# Patient Record
Sex: FEMALE | Race: WHITE | Employment: PART TIME | URBAN - NONMETROPOLITAN AREA
[De-identification: names, ages, dates, MRNs, and addresses within clinical notes are randomized per-mention and may not be internally consistent; named-entity substitution may affect disease eponyms.]

---

## 2020-09-23 ENCOUNTER — OFFICE VISIT (OUTPATIENT)
Dept: RHEUMATOLOGY | Age: 55
End: 2020-09-23
Payer: MEDICARE

## 2020-09-23 VITALS
DIASTOLIC BLOOD PRESSURE: 88 MMHG | HEIGHT: 65 IN | HEART RATE: 55 BPM | WEIGHT: 235 LBS | OXYGEN SATURATION: 98 % | BODY MASS INDEX: 39.15 KG/M2 | SYSTOLIC BLOOD PRESSURE: 130 MMHG

## 2020-09-23 PROCEDURE — G8428 CUR MEDS NOT DOCUMENT: HCPCS | Performed by: INTERNAL MEDICINE

## 2020-09-23 PROCEDURE — G8417 CALC BMI ABV UP PARAM F/U: HCPCS | Performed by: INTERNAL MEDICINE

## 2020-09-23 PROCEDURE — 99244 OFF/OP CNSLTJ NEW/EST MOD 40: CPT | Performed by: INTERNAL MEDICINE

## 2020-09-23 RX ORDER — PREDNISONE 10 MG/1
TABLET ORAL
Qty: 15 TABLET | Refills: 0 | Status: SHIPPED | OUTPATIENT
Start: 2020-09-23 | End: 2020-10-03

## 2020-09-23 RX ORDER — ACETAMINOPHEN 160 MG
TABLET,DISINTEGRATING ORAL
COMMUNITY

## 2020-09-23 RX ORDER — LISINOPRIL 20 MG/1
20 TABLET ORAL DAILY
COMMUNITY

## 2020-09-23 RX ORDER — AMLODIPINE BESYLATE 10 MG/1
10 TABLET ORAL DAILY
COMMUNITY

## 2020-09-23 RX ORDER — BUTALBITAL, ACETAMINOPHEN AND CAFFEINE 50; 325; 40 MG/1; MG/1; MG/1
1 TABLET ORAL EVERY 4 HOURS PRN
COMMUNITY

## 2020-09-23 RX ORDER — FERROUS SULFATE 325(65) MG
325 TABLET ORAL
COMMUNITY

## 2020-09-23 RX ORDER — OMEGA-3 FATTY ACIDS CAP DELAYED RELEASE 1000 MG 1000 MG
3000 CAPSULE DELAYED RELEASE ORAL
COMMUNITY

## 2020-09-23 RX ORDER — DULOXETIN HYDROCHLORIDE 20 MG/1
20 CAPSULE, DELAYED RELEASE ORAL DAILY
COMMUNITY

## 2020-09-23 RX ORDER — ALBUTEROL SULFATE 0.63 MG/3ML
1 SOLUTION RESPIRATORY (INHALATION) EVERY 6 HOURS PRN
COMMUNITY

## 2020-09-23 RX ORDER — BUDESONIDE AND FORMOTEROL FUMARATE DIHYDRATE 160; 4.5 UG/1; UG/1
2 AEROSOL RESPIRATORY (INHALATION) 2 TIMES DAILY
COMMUNITY

## 2020-09-23 RX ORDER — METOPROLOL TARTRATE 100 MG/1
100 TABLET ORAL 2 TIMES DAILY
COMMUNITY

## 2020-09-23 RX ORDER — MONTELUKAST SODIUM 10 MG/1
10 TABLET ORAL NIGHTLY
COMMUNITY

## 2020-09-23 SDOH — HEALTH STABILITY: MENTAL HEALTH: HOW OFTEN DO YOU HAVE A DRINK CONTAINING ALCOHOL?: NEVER

## 2020-09-23 ASSESSMENT — ENCOUNTER SYMPTOMS
NAUSEA: 0
CONSTIPATION: 0
EYE REDNESS: 0
EYE PAIN: 0
DIARRHEA: 0
VOMITING: 0
COUGH: 0
EYES NEGATIVE: 1
WHEEZING: 0
SHORTNESS OF BREATH: 0

## 2020-09-23 NOTE — PROGRESS NOTES
[place]   Date Of Service: 9/23/2020  Provider: Damion Kohler DO  Name: Chon Ferrara   MRN: [id number]    Chief Complaint(s)      Chief Complaint   Patient presents with   Aime Rutherford Established New Doctor     RHEUMATOID ATHRITIS        History of Present illness (HPI)    Chon Ferrara   is a(n)55 y.o. female with a hx of high blood pressure, obesity, osteoarthritis, h/o rheumatoid arthritis,  tobacco dependence, depression,  referred by Consuelo Solorzano CNP for evaluation of polyarthralgia and reported rheumatoid arthritis. Symptoms started around 23 y.o.a with pain and swelling of the knees. Pain has since become generalized & worsened now with pain affecting the hands, wristt, elbows, shoulder, hips, kenes, nakles, toes, neck, and back. Pain is constant in the shoulder, hands, right  Leg . Varies from 5-9/10. Timing: mornings, evenings after working. Aggravating factors: increased use of joint. Weather changes, back - standing, walking, endings. Legs \" wt bering\". Alleviating factors: tylenol, biofreeze, Current therapy: cymbatla, tylenoll. Previous therapy:biofreeze, flexeril, OTC NSAIDs    Active joint swelling bilatearl hands, knees, ankles (dependent) . +  Stiffness about 2-3 hours, + weakness of hands and legs (worse with walking). Difficulty grasping with dropping a lot of things. Difficulty getting up from seated positiong. + gelling. + fatigue, + itching eyes . Swelling of the.  Redness of hands intermittently occurring. + parethesia of hands nad arms - causing insomnia. + cramping - hands , shoulder and legs. Difficulty sleeping b/c of pain.        -denies Photosenstivity, Rash, dry mouth/dry eyes, oral/nasal sores, Raynaud's, digital ulcerations, skin tightening, renal disease,foamy urination, hematuria, sz's, blood clots,  pre-term labor loss, AIHA,leukpenia/lymphopenia, thrombocytopenia, hair loss, serositis,      Review of Systems    Review of Systems   Constitutional: Positive for fatigue. Negative for diaphoresis and fever. HENT: Negative for congestion, hearing loss and nosebleeds. Eyes: Negative. Negative for pain and redness. Respiratory: Negative for cough, shortness of breath and wheezing. Cardiovascular: Negative. Negative for chest pain. Gastrointestinal: Negative for constipation, diarrhea, nausea and vomiting. Genitourinary: Negative for difficulty urinating, frequency and hematuria. Musculoskeletal: Negative for myalgias. Skin: Negative for rash. Neurological: Negative for dizziness, weakness and headaches. Hematological: Does not bruise/bleed easily. Psychiatric/Behavioral: Negative for sleep disturbance. The patient is not nervous/anxious. PAST MEDICAL HISTORY     has a past medical history of Anxiety, Depression, Hypertension, Migraine, and Osteoarthritis. PAST SURGICAL HISTORY     has a past surgical history that includes sinus surgery; knee surgery; Tubal ligation; Gastric bypass surgery; Appendectomy; Neck surgery; and hernia repair. FAMILY HISTORY      Family History   Problem Relation Age of Onset    Rheum Arthritis Mother     Cancer Mother         non-hogkins lymphoma    Crohn's Disease Paternal Cousin     Depression Daughter     Asthma Daughter     Anxiety Disorder Daughter        SOCIAL HISTORY     reports that she has been smoking. She has been smoking about 0.50 packs per day. She has never used smokeless tobacco. She reports that she does not drink alcohol or use drugs.       ALLERGIES     Allergies   Allergen Reactions    Erythromycin Hives and Shortness Of Breath       CURRENT MEDICATIONS      Current Outpatient Medications:     albuterol (ACCUNEB) 0.63 MG/3ML nebulizer solution, Take 1 ampule by nebulization every 6 hours as needed for Wheezing, Disp: , Rfl:     budesonide-formoterol (SYMBICORT) 160-4.5 MCG/ACT AERO, Inhale 2 puffs into the lungs 2 times daily, Disp: , Rfl:     DULoxetine (CYMBALTA) 20 MG extended release capsule, Take 20 mg by mouth daily, Disp: , Rfl:     lisinopril (PRINIVIL;ZESTRIL) 20 MG tablet, Take 20 mg by mouth daily, Disp: , Rfl:     montelukast (SINGULAIR) 10 MG tablet, Take 10 mg by mouth nightly, Disp: , Rfl:     amLODIPine (NORVASC) 10 MG tablet, Take 10 mg by mouth daily, Disp: , Rfl:     Cholecalciferol (VITAMIN D3) 50 MCG (2000 UT) CAPS, Take by mouth, Disp: , Rfl:     metoprolol (LOPRESSOR) 100 MG tablet, Take 100 mg by mouth 2 times daily, Disp: , Rfl:     butalbital-acetaminophen-caffeine (FIORICET, ESGIC) -40 MG per tablet, Take 1 tablet by mouth every 4 hours as needed for Headaches, Disp: , Rfl:     Omega-3 Fatty Acids (FISH OIL) 1000 MG CPDR, Take 3,000 mg by mouth, Disp: , Rfl:     ferrous sulfate (IRON 325) 325 (65 Fe) MG tablet, Take 325 mg by mouth daily (with breakfast), Disp: , Rfl:     PHYSICAL EXAMINATION / OBJECTIVE     Objective:  /88 (Site: Left Upper Arm, Position: Sitting, Cuff Size: Medium Adult)   Pulse 55   Ht 5' 5\" (1.651 m)   Wt 235 lb (106.6 kg)   SpO2 98%   BMI 39.11 kg/m²     General Appearance:   alert and oriented to person, place and time well-developed and well nourished  Physch : appropriate affects ,   Head:  Normocephalic and atraumatic  Eyes: No gross abnormalities. ,  PERRL, Sclera nonicteric, conjunctiva non-INJECTED  ENT:  MMM,  no deformities , NO oral/nasal sores, Non-tender sinuses. Neck:  Neck supple, Non-tender, No  mass, thyromegaly,    Lymph:  No cervical  or  supraclavicular lymph node swelling.     Pulmonary/Chest:  CTA bilat. , normal air movement, no respiratory distress  Cardiovascular:  Normal rate, + S1 and S2,  NO murmurs , rubs, gallups,       :  Deferred   Abd/GI: Deferred   Neurologic:  gait and coordination normal and speech normal  Skin:  Skin color and temp ,  No rashes or lesions  Fatty subcu nodules along the left dorsal forearm and mid upper left arm    Extremities:  No clubbing , Musculoskeletal:  Limited  ROM shoulder bilat, Rt wrist (flx/ext). , 4+-5/5 Strength bilat upper and lower ext. Upper extremities: SHOULDERS tender : right > left. Charley Sorrow + speed, hawking carlos (right). ,      ELBOWS tender bilat. NEG. tinel,    WRISTS  + tender right > left, + tinel bilat. - phanel. HANDS/FINGERS - Tender MCP 1-3 bilat, PIPs 2-5 bilat. DIPs 1-5 bilat. . Swelling MCPs 2-3 bilat, PIPs 2-4 left,  DIPs left 1-5.  + heberden nodes  Lower extremities: HIPS  tedner bilat.  , KNEES tender, crepitus bilat. Neg. Draw. Compression, lashawn. , ANKLES non-tender , FEET : tender - post righ heal      Spine:  C-spine, T-spine & L-spine:   tender Lumbar spine, traps,  ,  + shober, - tea, - Occiput to wall , - SLR/Cross SLR.       KEY:  Tender : T  Swelling: S  Non-tender : NT  No swelling: NS         LABS        CBC  No results found for: WBC, RBC, HGB, HCT, MCV, MCH, MCHC, RDW, PLT    CMP  No results found for: CALCIUM, LABALBU, PROT, NA, K, CO2, CL, BUN, CREATININE, ALKPHOS, ALT, AST    HgBA1c: No components found for: HGBA1C    No results found for: TSHFT4, TSH  No results found for: VITD25      No results found for: ANASCRN  No results found for: SSA  No results found for: SSB  No results found for: ANTI-SMITH  No results found for: DSDNAAB   No results found for: ANTIRNP  No results found for: C3, C4  No results found for: CCPAB  No results found for: RF    No components found for: CANCASCRN, APANCASCRN  No results found for: SEDRATE  No results found for: CRP    RADIOLOGY:       ASSESSMENT/PLAN    Assessment   Plan     No diagnosis found. 1. Polyarthralgia  / inflammatory arthritis. 2 ANURAG positive 1:640 nucloar dot. -Symptoms started around 23years of age with bilateral knee pain reported swelling. Progressively spread to generalized pain. Reported joint swelling affecting bilateral hands, knees, ankles (dependent swelling of ankles).   Positive prolonged morning stiffness greater than 60 minutes. Reported fatigue, itchy eyes, paresthesias bilateral hands worse with use. Relief of arthralgias with NSAIDs.,  Tylenol, Biofreeze. Prior treatment including back injections. Prior knee arthroscopy and cervical fusion. Mother with reported rheumatoid arthritis. Physical examination with synovitis bilateral MCPs, left PIPs, left DIPs, limited lumbosacral range of motion and multiple tender joints and myofascial tender points. Evaluation for systemic lupus spondyloarthropathy and rheumatoid arthritis. Distribution atypical for rheumatoid arthritis given DIP joint involvement and concerning for underlying spondyloarthropathy because of the limited lumbosacral range of motion and DIP joint involvement. Additionally concern for connective tissue disease given the patient's, + ANURAG and inflammatory arthritis. - prednisone challenge provided. - serologic and x-ray evaluation as outlined below. - CBC Auto Differential; Future  - Comprehensive Metabolic Panel; Future  - Sedimentation Rate; Future  - C-Reactive Protein; Future  - Miscellaneous Sendout 1; Future  - Hepatitis Panel, Acute; Future  - Uric Acid; Future  - C3 Complement; Future  - C4 Complement; Future  - Lupus Anticoagulant; Future  - Urinalysis; Future  - XR CHEST (2 VW); Future        2. Chronic midline low back pain with bilateral sciatica  Bilateral radicular symptoms reported. Prolonged morning stiffness, nighttime waking because of pain. Prolonged morning stiffness. Positive Schober testing on physical examination. Negative Jose's and occiput to wall testing. -- X-ray evaluation to evaluate for degenerative arthritis versus inflammatory features. - CBC Auto Differential; Future  - Comprehensive Metabolic Panel; Future  - Sedimentation Rate; Future  - C-Reactive Protein; Future  - Miscellaneous Sendout 1; Future  - Hepatitis Panel, Acute; Future  - Uric Acid; Future  - C3 Complement;  Future  - C4 Complement;

## 2020-10-15 LAB
ALBUMIN SERPL-MCNC: 4.1 G/DL
ALP BLD-CCNC: 101 U/L
ALT SERPL-CCNC: 17 U/L
ANION GAP SERPL CALCULATED.3IONS-SCNC: 1.2 MMOL/L
AST SERPL-CCNC: 13 U/L
BASOPHILS ABSOLUTE: NORMAL
BASOPHILS RELATIVE PERCENT: NORMAL
BILIRUB SERPL-MCNC: 0.6 MG/DL (ref 0.1–1.4)
BILIRUBIN, URINE: NEGATIVE
BLOOD, URINE: NEGATIVE
BUN BLDV-MCNC: 18 MG/DL
C-REACTIVE PROTEIN: 0.14
CALCIUM SERPL-MCNC: 9 MG/DL
CHLORIDE BLD-SCNC: 101 MMOL/L
CLARITY: NORMAL
CO2: 25 MMOL/L
COLOR: NORMAL
CREAT SERPL-MCNC: 0.71 MG/DL
EOSINOPHILS ABSOLUTE: NORMAL
EOSINOPHILS RELATIVE PERCENT: NORMAL
GFR CALCULATED: 60
GLUCOSE BLD-MCNC: 93 MG/DL
GLUCOSE URINE: NEGATIVE
HCT VFR BLD CALC: 37.6 % (ref 36–46)
HEMOGLOBIN: 12.5 G/DL (ref 12–16)
KETONES, URINE: NEGATIVE
LEUKOCYTE ESTERASE, URINE: NEGATIVE
LYMPHOCYTES ABSOLUTE: NORMAL
LYMPHOCYTES RELATIVE PERCENT: NORMAL
MCH RBC QN AUTO: 31.1 PG
MCHC RBC AUTO-ENTMCNC: 33.2 G/DL
MCV RBC AUTO: 93.7 FL
MONOCYTES ABSOLUTE: NORMAL
MONOCYTES RELATIVE PERCENT: NORMAL
NEUTROPHILS ABSOLUTE: NORMAL
NEUTROPHILS RELATIVE PERCENT: NORMAL
NITRITE, URINE: NEGATIVE
PDW BLD-RTO: 13.9 %
PH UA: 6 (ref 4.5–8)
PLATELET # BLD: 374 K/ΜL
PMV BLD AUTO: 7.2 FL
POTASSIUM SERPL-SCNC: 4.5 MMOL/L
PROTEIN UA: NEGATIVE
RBC # BLD: 4.02 10^6/ΜL
SEDIMENTATION RATE, ERYTHROCYTE: 21
SODIUM BLD-SCNC: 134 MMOL/L
SPECIFIC GRAVITY, URINE: 1.01
TOTAL CK: 58 U/L
TOTAL PROTEIN: 7.4
URIC ACID: 4.7
UROBILINOGEN, URINE: NORMAL
WBC # BLD: 7 10^3/ML

## 2020-10-19 NOTE — RESULT ENCOUNTER NOTE
The chest x-ray revealed possible haziness/atelectasis within the left lung. Please keep your scheduled follow-up appointment for next week.

## 2020-10-19 NOTE — RESULT ENCOUNTER NOTE
Blood testing revealing a mild elevation of the platelet count which can be related to active systemic inflammation. The liver, blood counts and urine studies revealed no significant abnormalities. The uric acid which helps evaluate for possible gouty arthritis was also within normal limits. Additional blood tests are still pending once they have returned you will be notified.

## 2020-10-19 NOTE — RESULT ENCOUNTER NOTE
Worsening of the arthritic changes of the lumbar spine along with a slipped disc affecting the lower lumbar spine.

## 2020-10-20 ENCOUNTER — TELEPHONE (OUTPATIENT)
Dept: RHEUMATOLOGY | Age: 55
End: 2020-10-20

## 2020-10-20 NOTE — TELEPHONE ENCOUNTER
----- Message from Quincy Lyle DO sent at 10/19/2020  5:19 PM EDT -----  The chest x-ray revealed possible haziness/atelectasis within the left lung. Please keep your scheduled follow-up appointment for next week.

## 2020-10-20 NOTE — TELEPHONE ENCOUNTER
----- Message from Darian Dawn DO sent at 10/19/2020  5:17 PM EDT -----  Worsening of the arthritic changes of the lumbar spine along with a slipped disc affecting the lower lumbar spine.

## 2020-10-20 NOTE — TELEPHONE ENCOUNTER
----- Message from Kev Milner DO sent at 10/19/2020  5:15 PM EDT -----  The x-ray of the hands revealed degenerative arthritis affecting both hands.

## 2020-10-20 NOTE — TELEPHONE ENCOUNTER
----- Message from Tin Townsend DO sent at 10/19/2020  5:16 PM EDT -----  Blood testing revealing a mild elevation of the platelet count which can be related to active systemic inflammation. The liver, blood counts and urine studies revealed no significant abnormalities. The uric acid which helps evaluate for possible gouty arthritis was also within normal limits. Additional blood tests are still pending once they have returned you will be notified.

## 2020-10-20 NOTE — TELEPHONE ENCOUNTER
Attempted to call her \"calling restrictions\"     Called her EC and left message to have her call back

## 2020-10-26 ENCOUNTER — OFFICE VISIT (OUTPATIENT)
Dept: RHEUMATOLOGY | Age: 55
End: 2020-10-26
Payer: MEDICARE

## 2020-10-26 VITALS
SYSTOLIC BLOOD PRESSURE: 104 MMHG | WEIGHT: 240.6 LBS | HEART RATE: 56 BPM | BODY MASS INDEX: 40.08 KG/M2 | HEIGHT: 65 IN | OXYGEN SATURATION: 96 % | TEMPERATURE: 97.1 F | DIASTOLIC BLOOD PRESSURE: 58 MMHG

## 2020-10-26 PROCEDURE — 4004F PT TOBACCO SCREEN RCVD TLK: CPT | Performed by: NURSE PRACTITIONER

## 2020-10-26 PROCEDURE — G8484 FLU IMMUNIZE NO ADMIN: HCPCS | Performed by: NURSE PRACTITIONER

## 2020-10-26 PROCEDURE — 99213 OFFICE O/P EST LOW 20 MIN: CPT | Performed by: NURSE PRACTITIONER

## 2020-10-26 PROCEDURE — 3017F COLORECTAL CA SCREEN DOC REV: CPT | Performed by: NURSE PRACTITIONER

## 2020-10-26 PROCEDURE — G8427 DOCREV CUR MEDS BY ELIG CLIN: HCPCS | Performed by: NURSE PRACTITIONER

## 2020-10-26 PROCEDURE — G8417 CALC BMI ABV UP PARAM F/U: HCPCS | Performed by: NURSE PRACTITIONER

## 2020-10-26 ASSESSMENT — ENCOUNTER SYMPTOMS
ABDOMINAL PAIN: 0
SHORTNESS OF BREATH: 0
EYE PAIN: 0
NAUSEA: 0
EYE ITCHING: 0
BACK PAIN: 0
COUGH: 1
TROUBLE SWALLOWING: 0
DIARRHEA: 0
CONSTIPATION: 0

## 2020-10-26 NOTE — PROGRESS NOTES
[facility] RHEUMATOLOGY FOLLOW UP NOTE       Date Of Service: 10/26/2020  Provider: LIA Murray - CNP    Name: Stephenie Miller   MRN: [id number]    Chief Complaint(s)     Chief Complaint   Patient presents with    Follow-up     4 week f/u  bilat sciatica        History of Present Illness (HPI)     Stephenie Miller  is a(n)55 y.o. female with a hx of HTN, obesity, osteoarthritis, h/o rheumatoid arthritis, tobacco dependence, depression here for the f/u evaluation of rheumatoid arthritis. Interval hx:    - good relief with prednisone   - have not received AVISE panel results    pain affecting the bilateral fingers, wrists, elbows, shoulders, hips, knees, ankles, right toes, neck, back  Pain on a scale 0-10: 7/10  Type of pain: constant shoulder, hands  Timing: mornings and evenings  Aggravating factors: increase use of joint, weather changes. Back: standing, walking. Legs: wt bearing. Alleviating factors: tylenol, biofreeze    Associated symptoms:  + swelling/  Redness/ warmth (hands), + AM stiffness lasting ~ 3 hours,  +  gelling        REVIEW OF SYSTEMS: (ROS)    Review of Systems   Constitutional: Negative for fatigue, fever and unexpected weight change. HENT: Negative for congestion and trouble swallowing. Eyes: Negative for pain and itching. Respiratory: Positive for cough (dry cough). Negative for shortness of breath. Cardiovascular: Negative for chest pain and leg swelling. Gastrointestinal: Negative for abdominal pain, constipation, diarrhea and nausea. Endocrine: Negative for cold intolerance and heat intolerance. Genitourinary: Negative for difficulty urinating, frequency and urgency. Musculoskeletal: Positive for arthralgias, joint swelling and myalgias. Negative for back pain. Skin: Negative for rash. Neurological: Positive for weakness and headaches. Negative for dizziness and numbness. Psychiatric/Behavioral: Positive for sleep disturbance.  The patient is nervous/anxious.         PAST MEDICAL HISTORY      Past Medical History:   Diagnosis Date    Anxiety     Depression     Hypertension     Migraine     Osteoarthritis        PAST SURGICAL HISTORY      Past Surgical History:   Procedure Laterality Date    APPENDECTOMY      GASTRIC BYPASS SURGERY      HERNIA REPAIR      KNEE SURGERY      NECK SURGERY      SINUS SURGERY      TUBAL LIGATION         FAMILY HISTORY      Family History   Problem Relation Age of Onset    Rheum Arthritis Mother     Cancer Mother         non-hogkins lymphoma    Crohn's Disease Paternal Cousin     Depression Daughter     Asthma Daughter     Anxiety Disorder Daughter        SOCIAL HISTORY      Social History     Tobacco History     Smoking Status  Current Every Day Smoker Smoking Frequency  0.5 packs/day    Smokeless Tobacco Use  Never Used          Alcohol History     Alcohol Use Status  Never          Drug Use     Drug Use Status  Never          Sexual Activity     Sexually Active  Not Asked                ALLERGIES     Allergies   Allergen Reactions    Erythromycin Hives and Shortness Of Breath       CURRENT MEDICATIONS      Current Outpatient Medications   Medication Sig Dispense Refill    albuterol (ACCUNEB) 0.63 MG/3ML nebulizer solution Take 1 ampule by nebulization every 6 hours as needed for Wheezing      budesonide-formoterol (SYMBICORT) 160-4.5 MCG/ACT AERO Inhale 2 puffs into the lungs 2 times daily      DULoxetine (CYMBALTA) 20 MG extended release capsule Take 20 mg by mouth daily      lisinopril (PRINIVIL;ZESTRIL) 20 MG tablet Take 20 mg by mouth daily      montelukast (SINGULAIR) 10 MG tablet Take 10 mg by mouth nightly      amLODIPine (NORVASC) 10 MG tablet Take 10 mg by mouth daily      Cholecalciferol (VITAMIN D3) 50 MCG (2000 UT) CAPS Take by mouth      metoprolol (LOPRESSOR) 100 MG tablet Take 100 mg by mouth 2 times daily      butalbital-acetaminophen-caffeine (FIORICET, ESGIC) -40 MG per tablet Take 1 tablet by mouth every 4 hours as needed for Headaches      Omega-3 Fatty Acids (FISH OIL) 1000 MG CPDR Take 3,000 mg by mouth      ferrous sulfate (IRON 325) 325 (65 Fe) MG tablet Take 325 mg by mouth daily (with breakfast)       No current facility-administered medications for this visit. PHYSICAL EXAMINATION / OBJECTIVE   Objective:  BP (!) 104/58 (Site: Left Upper Arm, Position: Sitting, Cuff Size: Large Adult)   Pulse 56   Temp 97.1 °F (36.2 °C)   Ht 5' 5\" (1.651 m)   Wt 240 lb 9.6 oz (109.1 kg)   SpO2 96%   BMI 40.04 kg/m²     Physical Exam  Vitals signs reviewed. Constitutional:       Appearance: She is well-developed. Neck:      Musculoskeletal: Normal range of motion and neck supple. Cardiovascular:      Rate and Rhythm: Normal rate and regular rhythm. Pulmonary:      Effort: Pulmonary effort is normal.      Breath sounds: Normal breath sounds. Abdominal:      Palpations: Abdomen is soft. Tenderness: There is no abdominal tenderness. Skin:     General: Skin is warm and dry. Findings: No rash. Neurological:      Mental Status: She is alert and oriented to person, place, and time. Deep Tendon Reflexes: Reflexes are normal and symmetric. Psychiatric:         Thought Content: Thought content normal.       Musculoskeletal:     Normal gait. Strength 5/5 in biceps, triceps, hips, knees,      Upper extremities:   Shoulders:  Tender bilat, No Deformities and intact ROM  Elbows:      + tender bilat, no swelling, No Deformities and intact ROM  Wrists        + tender R>L, + tinel bilat  Hands:      + tender bilat 1-3 MCPs, bilat 2-5 PIPs.  + bogginess bilat 2,3 MCP, left 2,3 PIPs, + heberden nodes    Lower extremities:  Hips:       Tender bilat outer hips, No Deformities and intact ROM  Knees :   + tender bilat, no swelling, No Deformities and intact ROM  Ankles:   Nontender, no swelling, No Deformities and intact ROM  Feet:       + tender right ankle, no swelling,         RAPID 3:   10/26/2020 --- RAPID 3: 4 + 7 + 7.5 = 18.5     Remission: <3  Low Disease Activity: <6  Moderate Disease Activity: >=6 and <=12  High Disease Activity: >12     LABS    CBC  Lab Results   Component Value Date    WBC 7.0 10/15/2020    RBC 4.02 10/15/2020    HGB 12.5 10/15/2020    HCT 37.6 10/15/2020    MCV 93.7 10/15/2020    MCH 31.1 10/15/2020    MCHC 33.2 10/15/2020    RDW 13.9 10/15/2020     10/15/2020       CMP  Lab Results   Component Value Date    CALCIUM 9.0 10/15/2020    LABALBU 4.1 10/15/2020     10/15/2020    K 4.5 10/15/2020    CO2 25 10/15/2020     10/15/2020    BUN 18 10/15/2020    CREATININE 0.71 10/15/2020    ALKPHOS 101 10/15/2020    ALT 17 10/15/2020    AST 13 10/15/2020       HgBA1c: No components found for: HGBA1C    No results found for: VITD25      No results found for: ANASCRN  No results found for: SSA  No results found for: SSB  No results found for: ANTI-SMITH  No results found for: DSDNAAB   No results found for: ANTIRNP  No results found for: C3, C4  No results found for: CCPAB  No results found for: RF    No components found for: CANCASCRN, APANCASCRN  Lab Results   Component Value Date    SEDRATE 21 10/15/2020     Lab Results   Component Value Date    CRP 0.14 10/15/2020       RADIOLOGY:     Xray lumbar spine 10/15/2020      ASSESSMENT/PLAN    Assessment   Plan     Inflammatory arthritis  + ANURAG 1:640 nucleolar dot  - symptoms started around 19 yoa w/ bilat knee pain and reported swelling. Progressively spread to generalized pain. Reported joint swelling affecting bilateral hand, knees, ankles (dependent swelling of ankles). + prolonged morning stifness. Reported fatigue, itchy eyes, paresthesias bilateral hands worse with use. Relief w/ NSAIDs, tylenol, biofreeze. Exam with synovitis MCPs, PIPs, DIPs. Limited lumbosacral ROM.    - prior tx: prednisone (sig relief)   - still awaiting AVISE panel- calling for results to be faxed   - normal

## 2020-10-28 NOTE — RESULT ENCOUNTER NOTE
The lab testing used to help evaluate for systemic lupus rheumatoid arthritis and other inflammatory conditions were negative. Given the continued inflammatory arthritis with like to pursue sulfasalazine twice daily or methotrexate once weekly. Both these medications can potentially be associated with side effects including but not limited to liver injury, low blood counts, increased risk for infections, upset stomach, nausea and vomiting, fatigue, lung toxicity/injury. Blood testing would be required once monthly initially but could be transitioned to every 12 weeks when stable dosing is obtained.

## 2020-10-29 ENCOUNTER — TELEPHONE (OUTPATIENT)
Dept: RHEUMATOLOGY | Age: 55
End: 2020-10-29

## 2020-10-29 RX ORDER — FOLIC ACID 1 MG/1
1 TABLET ORAL DAILY
Qty: 90 TABLET | Refills: 1 | Status: SHIPPED | OUTPATIENT
Start: 2020-10-29 | End: 2021-04-26

## 2020-10-29 NOTE — TELEPHONE ENCOUNTER
Patient called and notified of results. She voiced understanding. Medications discussed in length and she is okay with starting either medication- whichever Dr. Blas Horton thinks would be best.     Rx to [facility].

## 2021-04-26 RX ORDER — FOLIC ACID 1 MG/1
1 TABLET ORAL DAILY
Qty: 30 TABLET | Refills: 1 | Status: SHIPPED | OUTPATIENT
Start: 2021-04-26

## 2021-06-29 RX ORDER — FOLIC ACID 1 MG/1
1 TABLET ORAL DAILY
Qty: 30 TABLET | Refills: 11 | OUTPATIENT
Start: 2021-06-29

## 2022-12-13 ENCOUNTER — TELEPHONE (OUTPATIENT)
Dept: OBGYN CLINIC | Age: 57
End: 2022-12-13

## 2022-12-13 ENCOUNTER — OFFICE VISIT (OUTPATIENT)
Dept: OBGYN CLINIC | Age: 57
End: 2022-12-13

## 2022-12-13 VITALS — BODY MASS INDEX: 36.01 KG/M2 | WEIGHT: 216.4 LBS | SYSTOLIC BLOOD PRESSURE: 130 MMHG | DIASTOLIC BLOOD PRESSURE: 88 MMHG

## 2022-12-13 DIAGNOSIS — R19.09 LUMP IN THE GROIN: Primary | ICD-10-CM

## 2022-12-13 PROBLEM — I10 ESSENTIAL (PRIMARY) HYPERTENSION: Status: ACTIVE | Noted: 2018-03-02

## 2022-12-13 RX ORDER — MELOXICAM 15 MG/1
TABLET ORAL
COMMUNITY
Start: 2022-12-12

## 2022-12-13 RX ORDER — BUSPIRONE HYDROCHLORIDE 5 MG/1
TABLET ORAL
COMMUNITY
Start: 2022-12-01

## 2022-12-13 RX ORDER — LEVOTHYROXINE SODIUM 0.07 MG/1
TABLET ORAL
COMMUNITY
Start: 2022-12-12

## 2022-12-13 RX ORDER — CYCLOBENZAPRINE HCL 10 MG
TABLET ORAL
COMMUNITY
Start: 2022-12-12

## 2022-12-13 NOTE — TELEPHONE ENCOUNTER
Records requested from [facility].  12-13-22 cg

## 2022-12-13 NOTE — PROGRESS NOTES
DATE OF VISIT:  12/13/22    PATIENT NAME:  Pillo Bermudez     YOB: 1965    REASON FOR VISIT:    Chief Complaint   Patient presents with    Other     Has baseball size spot in groin area which is painful. Her PCP did an USN and told her it looked like swollen lymph node and referred her to us. She is concerned because she does have family history of lymphoma. HISTORY OF PRESENT ILLNESS:  Patient presents to [facility] care. Reports that she has baseball-sized lump in left side of groin that is painful. Denies any overlying skin changes, bruising. States that her PCP did USN which showed possible swollen LN. Concerned because she has family hx lymphoma. Will request records from PCP. Discussed possible referral to general surgery depending what imaging shows. No LMP recorded. Patient has had a hysterectomy. Vitals:    12/13/22 1335   BP: 130/88   Weight: 216 lb 6.4 oz (98.2 kg)     Body mass index is 36.01 kg/m².   Allergies   Allergen Reactions    Erythromycin Hives and Shortness Of Breath    Sulfa Antibiotics     Adhesive Tape      Current Outpatient Medications   Medication Sig Dispense Refill    busPIRone (BUSPAR) 5 MG tablet       cyclobenzaprine (FLEXERIL) 10 MG tablet       levothyroxine (SYNTHROID) 75 MCG tablet       albuterol (ACCUNEB) 0.63 MG/3ML nebulizer solution Take 1 ampule by nebulization every 6 hours as needed for Wheezing      budesonide-formoterol (SYMBICORT) 160-4.5 MCG/ACT AERO Inhale 2 puffs into the lungs 2 times daily      DULoxetine (CYMBALTA) 20 MG extended release capsule Take 20 mg by mouth daily      lisinopril (PRINIVIL;ZESTRIL) 20 MG tablet Take 20 mg by mouth daily      Cholecalciferol (VITAMIN D3) 50 MCG (2000 UT) CAPS Take by mouth      metoprolol (LOPRESSOR) 100 MG tablet Take 100 mg by mouth 2 times daily      ferrous sulfate (IRON 325) 325 (65 Fe) MG tablet Take 325 mg by mouth daily (with breakfast)      meloxicam (MOBIC) 15 MG tablet montelukast (SINGULAIR) 10 MG tablet Take 10 mg by mouth nightly (Patient not taking: Reported on 12/13/2022)      Omega-3 Fatty Acids (FISH OIL) 1000 MG CPDR Take 3,000 mg by mouth (Patient not taking: Reported on 12/13/2022)       No current facility-administered medications for this visit. Social History     Socioeconomic History    Marital status: Single     Spouse name: None    Number of children: None    Years of education: None    Highest education level: None   Tobacco Use    Smoking status: Every Day     Packs/day: 0.50     Types: Cigarettes    Smokeless tobacco: Never   Vaping Use    Vaping Use: Never used   Substance and Sexual Activity    Alcohol use: Never    Drug use: Never    Sexual activity: Not Currently     Partners: Male       REVIEW OF SYSTEMS:  Review of Systems   Constitutional:  Negative for chills, fatigue and fever. Genitourinary:         Baseball sized lump in groin     PHYSICAL EXAM:  /88   Wt 216 lb 6.4 oz (98.2 kg)   BMI 36.01 kg/m²   Physical Exam  Constitutional:       Appearance: Normal appearance. Genitourinary:      Inguinal canal exam comments: No palpable mass in left groin; patient does have excess skin from weight loss surgery so discussed possibility of lipoma; discussed possible hernia as she notes it worsens when she stands or lifts heavy items. HENT:      Head: Normocephalic and atraumatic. Mouth/Throat:      Mouth: Mucous membranes are moist.   Eyes:      Extraocular Movements: Extraocular movements intact. Musculoskeletal:         General: Normal range of motion. Cervical back: Normal range of motion. Neurological:      General: No focal deficit present. Mental Status: She is alert and oriented to person, place, and time. Skin:     General: Skin is warm and dry. Psychiatric:         Mood and Affect: Mood normal.         Behavior: Behavior normal.         Thought Content:  Thought content normal.     The patientValorie is a 62 y.o. female, was seen with a total time spent of 30 minutes for the visit on this date of service by the E/M provider. The time component had both face to face and non face to face time spent in determining the total time component. Counseling and education regarding her diagnosis listed below and her options regarding those diagnoses were also included in determining her time component. The patient had her preventative health maintenance recommendations and follow-up reviewed with her at the completion of her visit. ASSESSMENT:  1. Lump in the groin        PLAN:  Awaiting USN results from PCP    No orders of the defined types were placed in this encounter. Return if symptoms worsen or fail to improve.        Electronically signed by Tyrone Phillips PA-C on 12/14/22

## 2022-12-14 ENCOUNTER — TELEPHONE (OUTPATIENT)
Dept: OBGYN CLINIC | Age: 57
End: 2022-12-14